# Patient Record
Sex: MALE | Race: WHITE | URBAN - METROPOLITAN AREA
[De-identification: names, ages, dates, MRNs, and addresses within clinical notes are randomized per-mention and may not be internally consistent; named-entity substitution may affect disease eponyms.]

---

## 2023-06-28 ENCOUNTER — TELEPHONE (OUTPATIENT)
Age: 68
End: 2023-06-28

## 2023-06-28 NOTE — TELEPHONE ENCOUNTER
06/28/23  Screened by: Bradford Cerrato MA    Referring Provider pcp    Pre- Screening: There is no height or weight on file to calculate BMI  Has patient been referred for a routine screening Colonoscopy? yes  Is the patient between 39-70 years old? no      Previous Colonoscopy yes   If yes:    Date: 10 years +    Facility:     Reason:       SCHEDULING STAFF: If the patient is between 45yrs-49yrs, please advise patient to confirm benefits/coverage with their insurance company for a routine screening colonoscopy, some insurance carriers will only cover at [facility] or older  If the patient is over 66years old, please schedule an office visit  Does the patient want to see a Gastroenterologist prior to their procedure OR are they having any GI symptoms? no    Has the patient been hospitalized or had abdominal surgery in the past 6 months? no    Does the patient use supplemental oxygen? no    Does the patient take Coumadin, Lovenox, Plavix, Elliquis, Xarelto, or other blood thinning medication? yes    Has the patient had a stroke, cardiac event, or stent placed in the past year? no    SCHEDULING STAFF: If patient answers NO to above questions, then schedule procedure  If patient answers YES to above questions, then schedule office appointment  NOT PASSES OA    If patient is between 45yrs - 49yrs, please advise patient that we will have to confirm benefits & coverage with their insurance company for a routine screening colonoscopy

## 2023-06-30 LAB
CREAT ?TM UR-SCNC: 78 UMOL/L
EXT ALBUMIN URINE RANDOM: 5
HBA1C MFR BLD HPLC: 5.3 %
MICROALBUMIN/CREAT UR: 8 MG/G{CREAT}

## 2023-08-29 ENCOUNTER — TELEPHONE (OUTPATIENT)
Dept: GASTROENTEROLOGY | Facility: CLINIC | Age: 68
End: 2023-08-29

## 2023-08-29 RX ORDER — ROSUVASTATIN CALCIUM 10 MG/1
10 TABLET, COATED ORAL DAILY
COMMUNITY

## 2023-08-29 RX ORDER — CALCIUM POLYCARBOPHIL 625 MG 625 MG/1
625 TABLET ORAL DAILY
COMMUNITY

## 2023-08-29 RX ORDER — METOPROLOL TARTRATE 50 MG/1
50 TABLET, FILM COATED ORAL EVERY 12 HOURS SCHEDULED
COMMUNITY

## 2023-08-29 RX ORDER — AMOXICILLIN 500 MG/1
500 CAPSULE ORAL EVERY 8 HOURS SCHEDULED
COMMUNITY

## 2023-08-29 RX ORDER — POTASSIUM CHLORIDE 20 MEQ/1
20 TABLET, EXTENDED RELEASE ORAL DAILY
COMMUNITY

## 2023-08-29 RX ORDER — FUROSEMIDE 40 MG/1
40 TABLET ORAL DAILY
COMMUNITY

## 2023-08-29 RX ORDER — WARFARIN SODIUM 5 MG/1
TABLET ORAL
COMMUNITY

## 2023-09-06 ENCOUNTER — TELEPHONE (OUTPATIENT)
Age: 68
End: 2023-09-06

## 2023-09-06 NOTE — TELEPHONE ENCOUNTER
Patients GI provider:  Dr. Bree Matthew    Number to return call: [phone]    Reason for call: Pt has medicare for Primary insurance, unable to process, please assist.     Pt has medicare for insurance- Member ID [id number]    Scheduled procedure/appointment date if applicable: Appt: 9/90/4219

## 2023-09-07 ENCOUNTER — TELEPHONE (OUTPATIENT)
Dept: GASTROENTEROLOGY | Facility: CLINIC | Age: 68
End: 2023-09-07

## 2023-09-07 ENCOUNTER — OFFICE VISIT (OUTPATIENT)
Dept: GASTROENTEROLOGY | Facility: CLINIC | Age: 68
End: 2023-09-07
Payer: MEDICARE

## 2023-09-07 VITALS
HEIGHT: 71 IN | DIASTOLIC BLOOD PRESSURE: 80 MMHG | SYSTOLIC BLOOD PRESSURE: 130 MMHG | WEIGHT: 211 LBS | BODY MASS INDEX: 29.54 KG/M2

## 2023-09-07 DIAGNOSIS — D69.6 THROMBOCYTOPENIA (HCC): ICD-10-CM

## 2023-09-07 DIAGNOSIS — Z71.89 ENCOUNTER FOR ANTICOAGULATION DISCUSSION AND COUNSELING: Primary | ICD-10-CM

## 2023-09-07 DIAGNOSIS — Z12.11 SCREENING FOR COLON CANCER: ICD-10-CM

## 2023-09-07 PROCEDURE — 99204 OFFICE O/P NEW MOD 45 MIN: CPT | Performed by: INTERNAL MEDICINE

## 2023-09-07 RX ORDER — POLYETHYLENE GLYCOL 3350 17 G/17G
238 POWDER, FOR SOLUTION ORAL ONCE
Qty: 238 G | Refills: 0 | Status: SHIPPED | OUTPATIENT
Start: 2023-09-07 | End: 2023-09-07

## 2023-09-07 NOTE — H&P (VIEW-ONLY)
[address] [place] [address] [facility]  Tosha Ogden 70 y.o. male MRN: [id number]  Encounter: [id number]    ASSESSMENT AND PLAN:      1. Encounter for anticoagulation discussion and counseling  - Colonoscopy; Future  - polyethylene glycol (MiraLax) 17 GM/SCOOP powder; Take 238 g by mouth once for 1 dose Take 238 g my mouth. Use as directed  Dispense: 238 g; Refill: 0  2. Thrombocytopenia ([address])  3. Screening for colon cancer  Average risk with no family history of colorectal neoplasia. Patient had hyperplastic polyp on previous colonoscopy. Discussed risks of bleeding with colonoscopic evaluation. Discussed risk of thrombosis and thromboembolic events if warfarin is held prior to planned screening colonoscopy. Is holding warfarin for 4 days and then proceeding with exam followed by restarting warfarin as soon as possible. Will review with patient's cardiologist.  Patient reported to have thrombocytopenia, last CBC reveals platelet count of [phone],485 which is adequate for endoscopic procedure. · Advise holding warfarin for 4 days prior to colonoscopy  · Will ask cardiology if any other preprocedure interventions are needed due to the patient's valvular heart disease  · Schedule screening colonoscopy      Followup Appointment: As needed  ______________________________________________________________________    Chief Complaint   Patient presents with   • Consult     colon       HPI:   Tosha Ogden is a 70y.o. year old male who presents to discuss anticoagulation issues prior to screening colonoscopy. PCP referring for screening colonoscopy. On anticoagulation. No GI symptoms. No blood in stools, no anemia. Appetite good and weight stable. Had mechanical valve replacement for AS. No bleeding. No chest pain. No SOB. Had bovine  And then cow valve. Mechanical valve replaced and also had CABG in  2008. No pulmonary or renal disease.       Historical Information   Past Medical History:   Diagnosis Date   • Acquired hallux rigidus    • Amputation, finger, traumatic    • Aortic insufficiency    • Aortic stenosis    • Benign hypertension    • Cellulitis of leg without foot, right    • Chronic anticoagulation    • H/O aortic valve replacement    • Hypercholesterolemia    • Hypercoagulability due to heart valve disorder (HCC)    • Insomnia    • Necrotizing fasciitis of lower leg (HCC)    • Obesity    • S/P CABG x 1    • S/P left inguinal hernia repair    • Skin lesions    • Smoker      Past Surgical History:   Procedure Laterality Date   • COLONOSCOPY      July 2014: Hyperplastic sigmoid polyp, diverticulosis, internal hemorrhoids, cecal AVM     Social History     Substance and Sexual Activity   Alcohol Use None     Social History     Substance and Sexual Activity   Drug Use Not on file     Social History     Tobacco Use   Smoking Status Every Day   • Types: Cigarettes   Smokeless Tobacco Never     Family History   Problem Relation Age of Onset   • Colon cancer Neg Hx    • Colon polyps Neg Hx        Meds/Allergies     Current Outpatient Medications:   •  amoxicillin (AMOXIL) 500 mg capsule  •  calcium polycarbophil (FIBERCON) 625 mg tablet  •  furosemide (LASIX) 40 mg tablet  •  metoprolol tartrate (LOPRESSOR) 50 mg tablet  •  polyethylene glycol (MiraLax) 17 GM/SCOOP powder  •  potassium chloride (K-DUR,KLOR-CON) 20 mEq tablet  •  rosuvastatin (CRESTOR) 10 MG tablet  •  warfarin (COUMADIN) 5 mg tablet    Allergies   Allergen Reactions   • Other Other (See Comments)     Bee stings - swelling       PHYSICAL EXAM:    Blood pressure 130/80, height 5' 11" (1.803 m), weight 95.7 kg (211 lb). Body mass index is 29.43 kg/m². General Appearance: NAD, cooperative, alert  Eyes: Anicteric, PERRLA, EOMI  ENT:  Normocephalic, atraumatic, normal mucosa.     Neck:  Supple, symmetrical, trachea midline,   Resp:  Clear to auscultation bilaterally; no rales, rhonchi or wheezing; respirations unlabored CV:  S1 S2, Regular rate and rhythm; no murmur, rub, or gallop. GI:  Soft, non-tender, non-distended; normal bowel sounds; no masses, no organomegaly   Rectal: Deferred  Musculoskeletal: No cyanosis, clubbing or edema. Normal ROM. Skin:  No jaundice, rashes, or lesions   Heme/Lymph: No palpable cervical lymphadenopathy  Psych: Normal affect, good eye contact  Neuro: No gross deficits, AAOx3    Lab Results:   No results found for: "WBC", "HGB", "HCT", "MCV", "PLT"  No results found for: "NA", "K", "CL", "CO2", "ANIONGAP", "BUN", "CREATININE", "GLUCOSE", "GLUF", "CALCIUM", "CORRECTEDCA", "AST", "ALT", "ALKPHOS", "PROT", "BILITOT", "EGFR"  No results found for: "IRON", "TIBC", "FERRITIN"  No results found for: "LIPASE"    Radiology Results:   No results found. REVIEW OF SYSTEMS:    CONSTITUTIONAL: Denies any fever, chills, rigors, and weight loss. HEENT: No earache or tinnitus. Denies hearing loss or visual disturbances. CARDIOVASCULAR: No chest pain or palpitations. RESPIRATORY: Denies any cough, hemoptysis, shortness of breath or dyspnea on exertion. GASTROINTESTINAL: As noted in the History of Present Illness. GENITOURINARY: No problems with urination. Denies any hematuria or dysuria. NEUROLOGIC: No dizziness or vertigo, denies headaches. MUSCULOSKELETAL: Denies any muscle or joint pain. SKIN: Denies skin rashes or itching. ENDOCRINE: Denies excessive thirst. Denies intolerance to heat or cold. PSYCHOSOCIAL: Denies depression or anxiety. Denies any recent memory loss.

## 2023-09-07 NOTE — LETTER
September 7, 2023     DO Chloe Pizarro  [address]    Patient: Juju Yan   YOB: 1955   Date of Visit: 9/7/2023       Dear Dr. Renetta Latif: Thank you for referring Sheila Araiza to me for evaluation. Below are my notes for this consultation. If you have questions, please do not hesitate to call me. I look forward to following your patient along with you. Sincerely,        Irais Todd MD        CC: MD Irais Luna MD  9/7/2023 10:11 AM  Sign when Signing Visit    [address] [facility] - [facility]patient [facility]  Juju Yan 70 y.o. male MRN: [id number]  Encounter: [id number]    ASSESSMENT AND PLAN:      1. Encounter for anticoagulation discussion and counseling  - Colonoscopy; Future  - polyethylene glycol (MiraLax) 17 GM/SCOOP powder; Take 238 g by mouth once for 1 dose Take 238 g my mouth. Use as directed  Dispense: 238 g; Refill: 0  2. Thrombocytopenia ([address])  3. Screening for colon cancer  Average risk with no family history of colorectal neoplasia. Patient had hyperplastic polyp on previous colonoscopy. Discussed risks of bleeding with colonoscopic evaluation. Discussed risk of thrombosis and thromboembolic events if warfarin is held prior to planned screening colonoscopy. Is holding warfarin for 4 days and then proceeding with exam followed by restarting warfarin as soon as possible. Will review with patient's cardiologist.  Patient reported to have thrombocytopenia, last CBC reveals platelet count of 217,727 which is adequate for endoscopic procedure.   Advise holding warfarin for 4 days prior to colonoscopy  Will ask cardiology if any other preprocedure interventions are needed due to the patient's valvular heart disease  Schedule screening colonoscopy      Followup Appointment: As needed  ______________________________________________________________________    Chief Complaint   Patient presents with   • Consult     colon       HPI:   Sun Hall is a 70y.o. year old male who presents to discuss anticoagulation issues prior to screening colonoscopy. PCP referring for screening colonoscopy. On anticoagulation. No GI symptoms. No blood in stools, no anemia. Appetite good and weight stable. Had mechanical valve replacement for AS. No bleeding. No chest pain. No SOB. Had bovine  And then cow valve. Mechanical valve replaced and also had CABG in  2008. No pulmonary or renal disease.       Historical Information   Past Medical History:   Diagnosis Date   • Acquired hallux rigidus    • Amputation, finger, traumatic    • Aortic insufficiency    • Aortic stenosis    • Benign hypertension    • Cellulitis of leg without foot, right    • Chronic anticoagulation    • H/O aortic valve replacement    • Hypercholesterolemia    • Hypercoagulability due to heart valve disorder (HCC)    • Insomnia    • Necrotizing fasciitis of lower leg (HCC)    • Obesity    • S/P CABG x 1    • S/P left inguinal hernia repair    • Skin lesions    • Smoker      Past Surgical History:   Procedure Laterality Date   • COLONOSCOPY      July 2014: Hyperplastic sigmoid polyp, diverticulosis, internal hemorrhoids, cecal AVM     Social History     Substance and Sexual Activity   Alcohol Use None     Social History     Substance and Sexual Activity   Drug Use Not on file     Social History     Tobacco Use   Smoking Status Every Day   • Types: Cigarettes   Smokeless Tobacco Never     Family History   Problem Relation Age of Onset   • Colon cancer Neg Hx    • Colon polyps Neg Hx        Meds/Allergies     Current Outpatient Medications:   •  amoxicillin (AMOXIL) 500 mg capsule  •  calcium polycarbophil (FIBERCON) 625 mg tablet  •  furosemide (LASIX) 40 mg tablet  •  metoprolol tartrate (LOPRESSOR) 50 mg tablet  •  polyethylene glycol (MiraLax) 17 GM/SCOOP powder  •  potassium chloride (K-DUR,KLOR-CON) 20 mEq tablet  •  rosuvastatin (CRESTOR) 10 MG tablet  •  warfarin (COUMADIN) 5 mg tablet    Allergies   Allergen Reactions   • Other Other (See Comments)     Bee stings - swelling       PHYSICAL EXAM:    Blood pressure 130/80, height 5' 11" (1.803 m), weight 95.7 kg (211 lb). Body mass index is 29.43 kg/m². General Appearance: NAD, cooperative, alert  Eyes: Anicteric, PERRLA, EOMI  ENT:  Normocephalic, atraumatic, normal mucosa. Neck:  Supple, symmetrical, trachea midline,   Resp:  Clear to auscultation bilaterally; no rales, rhonchi or wheezing; respirations unlabored   CV:  S1 S2, Regular rate and rhythm; no murmur, rub, or gallop. GI:  Soft, non-tender, non-distended; normal bowel sounds; no masses, no organomegaly   Rectal: Deferred  Musculoskeletal: No cyanosis, clubbing or edema. Normal ROM. Skin:  No jaundice, rashes, or lesions   Heme/Lymph: No palpable cervical lymphadenopathy  Psych: Normal affect, good eye contact  Neuro: No gross deficits, AAOx3    Lab Results:   No results found for: "WBC", "HGB", "HCT", "MCV", "PLT"  No results found for: "NA", "K", "CL", "CO2", "ANIONGAP", "BUN", "CREATININE", "GLUCOSE", "GLUF", "CALCIUM", "CORRECTEDCA", "AST", "ALT", "ALKPHOS", "PROT", "BILITOT", "EGFR"  No results found for: "IRON", "TIBC", "FERRITIN"  No results found for: "LIPASE"    Radiology Results:   No results found. REVIEW OF SYSTEMS:    CONSTITUTIONAL: Denies any fever, chills, rigors, and weight loss. HEENT: No earache or tinnitus. Denies hearing loss or visual disturbances. CARDIOVASCULAR: No chest pain or palpitations. RESPIRATORY: Denies any cough, hemoptysis, shortness of breath or dyspnea on exertion. GASTROINTESTINAL: As noted in the History of Present Illness. GENITOURINARY: No problems with urination. Denies any hematuria or dysuria. NEUROLOGIC: No dizziness or vertigo, denies headaches. MUSCULOSKELETAL: Denies any muscle or joint pain. SKIN: Denies skin rashes or itching.    ENDOCRINE: Denies excessive thirst. Denies intolerance to heat or cold. PSYCHOSOCIAL: Denies depression or anxiety. Denies any recent memory loss.

## 2023-09-07 NOTE — PROGRESS NOTES
[facility] - Outpatient Consultation  Yury Cintron 70 y.o. male MRN: [id number]  Encounter: [id number]    ASSESSMENT AND PLAN:      1. Encounter for anticoagulation discussion and counseling  - Colonoscopy; Future  - polyethylene glycol (MiraLax) 17 GM/SCOOP powder; Take 238 g by mouth once for 1 dose Take 238 g my mouth. Use as directed  Dispense: 238 g; Refill: 0  2. Thrombocytopenia ([address])  3. Screening for colon cancer  Average risk with no family history of colorectal neoplasia. Patient had hyperplastic polyp on previous colonoscopy. Discussed risks of bleeding with colonoscopic evaluation. Discussed risk of thrombosis and thromboembolic events if warfarin is held prior to planned screening colonoscopy. Is holding warfarin for 4 days and then proceeding with exam followed by restarting warfarin as soon as possible. Will review with patient's cardiologist.  Patient reported to have thrombocytopenia, last CBC reveals platelet count of 341,205 which is adequate for endoscopic procedure. · Advise holding warfarin for 4 days prior to colonoscopy  · Will ask cardiology if any other preprocedure interventions are needed due to the patient's valvular heart disease  · Schedule screening colonoscopy      Followup Appointment: As needed  ______________________________________________________________________    Chief Complaint   Patient presents with   • Consult     colon       HPI:   Yury Cintron is a 70y.o. year old male who presents to discuss anticoagulation issues prior to screening colonoscopy. PCP referring for screening colonoscopy. On anticoagulation. No GI symptoms. No blood in stools, no anemia. Appetite good and weight stable. Had mechanical valve replacement for AS. No bleeding. No chest pain. No SOB. Had bovine  And then cow valve. Mechanical valve replaced and also had CABG in  2008. No pulmonary or renal disease.       Historical Information   Past Medical History:   Diagnosis Date   • Acquired hallux rigidus    • Amputation, finger, traumatic    • Aortic insufficiency    • Aortic stenosis    • Benign hypertension    • Cellulitis of leg without foot, right    • Chronic anticoagulation    • H/O aortic valve replacement    • Hypercholesterolemia    • Hypercoagulability due to heart valve disorder (HCC)    • Insomnia    • Necrotizing fasciitis of lower leg (HCC)    • Obesity    • S/P CABG x 1    • S/P left inguinal hernia repair    • Skin lesions    • Smoker      Past Surgical History:   Procedure Laterality Date   • COLONOSCOPY      July 2014: Hyperplastic sigmoid polyp, diverticulosis, internal hemorrhoids, cecal AVM     Social History     Substance and Sexual Activity   Alcohol Use None     Social History     Substance and Sexual Activity   Drug Use Not on file     Social History     Tobacco Use   Smoking Status Every Day   • Types: Cigarettes   Smokeless Tobacco Never     Family History   Problem Relation Age of Onset   • Colon cancer Neg Hx    • Colon polyps Neg Hx        Meds/Allergies     Current Outpatient Medications:   •  amoxicillin (AMOXIL) 500 mg capsule  •  calcium polycarbophil (FIBERCON) 625 mg tablet  •  furosemide (LASIX) 40 mg tablet  •  metoprolol tartrate (LOPRESSOR) 50 mg tablet  •  polyethylene glycol (MiraLax) 17 GM/SCOOP powder  •  potassium chloride (K-DUR,KLOR-CON) 20 mEq tablet  •  rosuvastatin (CRESTOR) 10 MG tablet  •  warfarin (COUMADIN) 5 mg tablet    Allergies   Allergen Reactions   • Other Other (See Comments)     Bee stings - swelling       PHYSICAL EXAM:    Blood pressure 130/80, height 5' 11" (1.803 m), weight 95.7 kg (211 lb). Body mass index is 29.43 kg/m². General Appearance: NAD, cooperative, alert  Eyes: Anicteric, PERRLA, EOMI  ENT:  Normocephalic, atraumatic, normal mucosa.     Neck:  Supple, symmetrical, trachea midline,   Resp:  Clear to auscultation bilaterally; no rales, rhonchi or wheezing; respirations unlabored CV:  S1 S2, Regular rate and rhythm; no murmur, rub, or gallop. GI:  Soft, non-tender, non-distended; normal bowel sounds; no masses, no organomegaly   Rectal: Deferred  Musculoskeletal: No cyanosis, clubbing or edema. Normal ROM. Skin:  No jaundice, rashes, or lesions   Heme/Lymph: No palpable cervical lymphadenopathy  Psych: Normal affect, good eye contact  Neuro: No gross deficits, AAOx3    Lab Results:   No results found for: "WBC", "HGB", "HCT", "MCV", "PLT"  No results found for: "NA", "K", "CL", "CO2", "ANIONGAP", "BUN", "CREATININE", "GLUCOSE", "GLUF", "CALCIUM", "CORRECTEDCA", "AST", "ALT", "ALKPHOS", "PROT", "BILITOT", "EGFR"  No results found for: "IRON", "TIBC", "FERRITIN"  No results found for: "LIPASE"    Radiology Results:   No results found. REVIEW OF SYSTEMS:    CONSTITUTIONAL: Denies any fever, chills, rigors, and weight loss. HEENT: No earache or tinnitus. Denies hearing loss or visual disturbances. CARDIOVASCULAR: No chest pain or palpitations. RESPIRATORY: Denies any cough, hemoptysis, shortness of breath or dyspnea on exertion. GASTROINTESTINAL: As noted in the History of Present Illness. GENITOURINARY: No problems with urination. Denies any hematuria or dysuria. NEUROLOGIC: No dizziness or vertigo, denies headaches. MUSCULOSKELETAL: Denies any muscle or joint pain. SKIN: Denies skin rashes or itching. ENDOCRINE: Denies excessive thirst. Denies intolerance to heat or cold. PSYCHOSOCIAL: Denies depression or anxiety. Denies any recent memory loss.

## 2023-09-07 NOTE — PATIENT INSTRUCTIONS
Advise holding warfarin for 4 days prior to colonoscopy  Will ask cardiology if any other preprocedure interventions are needed due to the patient's valvular heart disease  Schedule screening colonoscopy

## 2023-09-07 NOTE — TELEPHONE ENCOUNTER
Scheduled date of colonoscopy (as of today):9-21-23  Physician performing colonoscopy:Jacob  Location of colonoscopy:[facility]  Bowel prep reviewed with patient:Livan  Instructions reviewed with patient by:isra  Clearances: yes  Coumadin   sharee  [phone]

## 2023-09-08 ENCOUNTER — TELEPHONE (OUTPATIENT)
Dept: GASTROENTEROLOGY | Facility: CLINIC | Age: 68
End: 2023-09-08

## 2023-09-15 ENCOUNTER — NURSE TRIAGE (OUTPATIENT)
Age: 68
End: 2023-09-15

## 2023-09-15 ENCOUNTER — TELEPHONE (OUTPATIENT)
Dept: GASTROENTEROLOGY | Facility: CLINIC | Age: 68
End: 2023-09-15

## 2023-09-15 NOTE — TELEPHONE ENCOUNTER
Patients GI provider:  Dr. Shabbir Cheek    Number to return call: [phone]    Reason for call: Pt calling checking status on cardiac clearance. I informed pt that another fax was recently sent today directly to cardiac nurse and he will hear from us as soon as clearance clears.     Scheduled procedure/appointment date if applicable: Procedure 7/56/19

## 2023-09-15 NOTE — TELEPHONE ENCOUNTER
Refaxed request and will call office as well since we need it today, made office aware this is Urgent

## 2023-09-15 NOTE — TELEPHONE ENCOUNTER
SPOKE WITH PT WIFE, CALLING TO MAKE SURE PT IS STILL ON SCHEDULE FOR COLONOSCOPY ON 9/21/23, CONCERNED APPT WOULD GET CANCELLED DUE TO CARDIAC CLEARANCE NOT BEING RECEIVED. CONFIRMED PT IS STILL CURRENTLY SCHEDULED, CONFIRMED WITH OFFICE AS WELL.

## 2023-09-15 NOTE — TELEPHONE ENCOUNTER
Imtiaz Vogel RN from cardiology dept calling in she did not receive cardiac clearance form- please fax to [phone] goes directly to Imtiaz Vogel     Thank you

## 2023-09-15 NOTE — TELEPHONE ENCOUNTER
Patient's wife called stating that they have not received his Coumadin clearance instructions to date and he is scheduled for procedure this Thursday 9/21/23. I reviewed that a request was sent to Dr. Jessica Ponce at [facility] but no reply to date. She requests office follow up on this today. She will also be making a call to their office.

## 2023-09-21 ENCOUNTER — ANESTHESIA (OUTPATIENT)
Dept: GASTROENTEROLOGY | Facility: AMBULATORY SURGERY CENTER | Age: 68
End: 2023-09-21

## 2023-09-21 ENCOUNTER — HOSPITAL ENCOUNTER (OUTPATIENT)
Dept: GASTROENTEROLOGY | Facility: AMBULATORY SURGERY CENTER | Age: 68
Discharge: HOME | End: 2023-09-21
Payer: MEDICARE

## 2023-09-21 ENCOUNTER — ANESTHESIA EVENT (OUTPATIENT)
Dept: GASTROENTEROLOGY | Facility: AMBULATORY SURGERY CENTER | Age: 68
End: 2023-09-21

## 2023-09-21 VITALS
RESPIRATION RATE: 24 BRPM | BODY MASS INDEX: 29.54 KG/M2 | SYSTOLIC BLOOD PRESSURE: 138 MMHG | WEIGHT: 211 LBS | HEIGHT: 71 IN | TEMPERATURE: 98 F | OXYGEN SATURATION: 95 % | DIASTOLIC BLOOD PRESSURE: 78 MMHG | HEART RATE: 101 BPM

## 2023-09-21 DIAGNOSIS — Z71.89 ENCOUNTER FOR ANTICOAGULATION DISCUSSION AND COUNSELING: ICD-10-CM

## 2023-09-21 PROCEDURE — 45385 COLONOSCOPY W/LESION REMOVAL: CPT | Performed by: INTERNAL MEDICINE

## 2023-09-21 PROCEDURE — 88305 TISSUE EXAM BY PATHOLOGIST: CPT | Performed by: STUDENT IN AN ORGANIZED HEALTH CARE EDUCATION/TRAINING PROGRAM

## 2023-09-21 RX ORDER — SODIUM CHLORIDE, SODIUM LACTATE, POTASSIUM CHLORIDE, CALCIUM CHLORIDE 600; 310; 30; 20 MG/100ML; MG/100ML; MG/100ML; MG/100ML
INJECTION, SOLUTION INTRAVENOUS CONTINUOUS PRN
Status: DISCONTINUED | OUTPATIENT
Start: 2023-09-21 | End: 2023-09-21

## 2023-09-21 RX ORDER — PROPOFOL 10 MG/ML
INJECTION, EMULSION INTRAVENOUS AS NEEDED
Status: DISCONTINUED | OUTPATIENT
Start: 2023-09-21 | End: 2023-09-21

## 2023-09-21 RX ORDER — SODIUM CHLORIDE 9 MG/ML
50 INJECTION, SOLUTION INTRAVENOUS CONTINUOUS
Status: DISCONTINUED | OUTPATIENT
Start: 2023-09-21 | End: 2023-09-25 | Stop reason: HOSPADM

## 2023-09-21 RX ORDER — LIDOCAINE HYDROCHLORIDE 10 MG/ML
INJECTION, SOLUTION EPIDURAL; INFILTRATION; INTRACAUDAL; PERINEURAL AS NEEDED
Status: DISCONTINUED | OUTPATIENT
Start: 2023-09-21 | End: 2023-09-21

## 2023-09-21 RX ADMIN — PROPOFOL 40 MG: 10 INJECTION, EMULSION INTRAVENOUS at 08:36

## 2023-09-21 RX ADMIN — PROPOFOL 80 MG: 10 INJECTION, EMULSION INTRAVENOUS at 08:29

## 2023-09-21 RX ADMIN — PROPOFOL 50 MG: 10 INJECTION, EMULSION INTRAVENOUS at 08:34

## 2023-09-21 RX ADMIN — PROPOFOL 20 MG: 10 INJECTION, EMULSION INTRAVENOUS at 08:28

## 2023-09-21 RX ADMIN — PROPOFOL 30 MG: 10 INJECTION, EMULSION INTRAVENOUS at 08:27

## 2023-09-21 RX ADMIN — PROPOFOL 100 MG: 10 INJECTION, EMULSION INTRAVENOUS at 08:23

## 2023-09-21 RX ADMIN — PROPOFOL 40 MG: 10 INJECTION, EMULSION INTRAVENOUS at 08:30

## 2023-09-21 RX ADMIN — LIDOCAINE HYDROCHLORIDE 50 MG: 10 INJECTION, SOLUTION EPIDURAL; INFILTRATION; INTRACAUDAL; PERINEURAL at 08:23

## 2023-09-21 RX ADMIN — PROPOFOL 40 MG: 10 INJECTION, EMULSION INTRAVENOUS at 08:37

## 2023-09-21 RX ADMIN — SODIUM CHLORIDE 50 ML/HR: 9 INJECTION, SOLUTION INTRAVENOUS at 08:13

## 2023-09-21 RX ADMIN — SODIUM CHLORIDE, SODIUM LACTATE, POTASSIUM CHLORIDE, CALCIUM CHLORIDE: 600; 310; 30; 20 INJECTION, SOLUTION INTRAVENOUS at 08:13

## 2023-09-21 RX ADMIN — PROPOFOL 20 MG: 10 INJECTION, EMULSION INTRAVENOUS at 08:25

## 2023-09-21 RX ADMIN — PROPOFOL 30 MG: 10 INJECTION, EMULSION INTRAVENOUS at 08:33

## 2023-09-21 RX ADMIN — PROPOFOL 30 MG: 10 INJECTION, EMULSION INTRAVENOUS at 08:26

## 2023-09-21 NOTE — ANESTHESIA PREPROCEDURE EVALUATION
Procedure:  COLONOSCOPY    Relevant Problems   ANESTHESIA (within normal limits)      CARDIO   (+) Hypercholesterolemia      Other   (+) Chronic anticoagulation   (+) H/O aortic valve replacement   (+) S/P CABG x 1        Physical Exam    Airway    Mallampati score: I  TM Distance: >3 FB  Neck ROM: full     Dental   No notable dental hx     Cardiovascular  Cardiovascular exam normal    Pulmonary  Pulmonary exam normal     Other Findings        Anesthesia Plan  ASA Score- 3     Anesthesia Type- IV sedation with anesthesia with ASA Monitors. Additional Monitors:   Airway Plan:     Comment: I discussed risks (reviewed with patient on the anesthesia consent form), benefits and alternatives of monitored sedation including the possibility under sedation to have recall or mild discomfort. .       Plan Factors-    Chart reviewed. Patient summary reviewed. Induction- intravenous. Postoperative Plan-     Informed Consent- Anesthetic plan and risks discussed with patient. I personally reviewed this patient with the CRNA. Discussed and agreed on the Anesthesia Plan with the CRNA. Mary Morales

## 2023-09-21 NOTE — INTERVAL H&P NOTE
H&P reviewed. After examining the patient I find no changes in the patients condition since the H&P had been written.     Vitals:    09/21/23 0802   BP: 135/76   Pulse: (!) 108   Resp: 20   Temp: 98 °F (36.7 °C)   SpO2: 97%

## 2023-09-21 NOTE — ANESTHESIA POSTPROCEDURE EVALUATION
Post-Op Assessment Note    CV Status:  Stable  Pain Score: 0    Pain management: adequate     Mental Status:  Alert and awake   Hydration Status:  Euvolemic   PONV Controlled:  Controlled   Airway Patency:  Patent      Post Op Vitals Reviewed: Yes      Staff: CRNA, Anesthesiologist         No notable events documented.     BP   119/64   Temp      Pulse 104   Resp   16   SpO2   97

## 2023-09-25 PROCEDURE — 88305 TISSUE EXAM BY PATHOLOGIST: CPT | Performed by: STUDENT IN AN ORGANIZED HEALTH CARE EDUCATION/TRAINING PROGRAM

## 2024-06-22 ENCOUNTER — HOSPITAL ENCOUNTER (OUTPATIENT)
Dept: HOSPITAL 99 - REG | Age: 69
End: 2024-06-22
Payer: MEDICARE

## 2024-06-22 DIAGNOSIS — I48.92: Primary | ICD-10-CM

## 2024-06-22 LAB
INR PPP: 2.53
PROTHROMBIN TIME: 27.2 SEC (ref 11.4–14.6)

## 2025-07-30 ENCOUNTER — TELEPHONE (OUTPATIENT)
Age: 70
End: 2025-07-30

## 2025-07-30 RX ORDER — ENOXAPARIN SODIUM 100 MG/ML
100 INJECTION SUBCUTANEOUS EVERY 12 HOURS
COMMUNITY
Start: 2025-07-21 | End: 2025-08-06 | Stop reason: SDUPTHER

## 2025-07-30 RX ORDER — LANSOPRAZOLE 30 MG/1
CAPSULE, DELAYED RELEASE ORAL
COMMUNITY
Start: 2025-07-30

## 2025-07-30 RX ORDER — DIGOXIN 125 MCG
125 TABLET ORAL DAILY
COMMUNITY
Start: 2025-06-16

## 2025-07-30 RX ORDER — AMIODARONE HYDROCHLORIDE 200 MG/1
TABLET ORAL
COMMUNITY
Start: 2025-07-30

## 2025-08-06 ENCOUNTER — OFFICE VISIT (OUTPATIENT)
Age: 70
End: 2025-08-06
Payer: MEDICARE

## 2025-08-06 ENCOUNTER — ANTICOAG VISIT (OUTPATIENT)
Age: 70
End: 2025-08-06

## 2025-08-06 VITALS
BODY MASS INDEX: 30.4 KG/M2 | WEIGHT: 224.4 LBS | DIASTOLIC BLOOD PRESSURE: 70 MMHG | HEART RATE: 77 BPM | OXYGEN SATURATION: 96 % | SYSTOLIC BLOOD PRESSURE: 120 MMHG | HEIGHT: 72 IN | TEMPERATURE: 97.9 F

## 2025-08-06 DIAGNOSIS — Z95.2 H/O AORTIC VALVE REPLACEMENT: ICD-10-CM

## 2025-08-06 DIAGNOSIS — R91.8 PULMONARY NODULES: Primary | ICD-10-CM

## 2025-08-06 DIAGNOSIS — R29.898 WEAKNESS OF LEFT LOWER EXTREMITY: ICD-10-CM

## 2025-08-06 DIAGNOSIS — I48.0 PAF (PAROXYSMAL ATRIAL FIBRILLATION) (HCC): ICD-10-CM

## 2025-08-06 DIAGNOSIS — D69.6 THROMBOCYTOPENIA (HCC): ICD-10-CM

## 2025-08-06 DIAGNOSIS — C61 MALIGNANT NEOPLASM OF PROSTATE (HCC): ICD-10-CM

## 2025-08-06 DIAGNOSIS — Z95.1 S/P CABG X 1: ICD-10-CM

## 2025-08-06 PROCEDURE — G2211 COMPLEX E/M VISIT ADD ON: HCPCS | Performed by: INTERNAL MEDICINE

## 2025-08-06 PROCEDURE — 99213 OFFICE O/P EST LOW 20 MIN: CPT | Performed by: INTERNAL MEDICINE

## 2025-08-06 PROCEDURE — 99496 TRANSJ CARE MGMT HIGH F2F 7D: CPT | Performed by: INTERNAL MEDICINE

## 2025-08-06 RX ORDER — MULTIVITAMIN
1 TABLET ORAL DAILY
COMMUNITY

## 2025-08-06 RX ORDER — LORAZEPAM 0.5 MG/1
0.5 TABLET ORAL
COMMUNITY
End: 2025-08-06 | Stop reason: SDUPTHER